# Patient Record
Sex: MALE
[De-identification: names, ages, dates, MRNs, and addresses within clinical notes are randomized per-mention and may not be internally consistent; named-entity substitution may affect disease eponyms.]

---

## 2019-09-16 ENCOUNTER — APPOINTMENT (OUTPATIENT)
Age: 59
End: 2019-09-16
Payer: MEDICAID

## 2019-09-16 VITALS
HEART RATE: 86 BPM | WEIGHT: 163 LBS | HEIGHT: 65 IN | DIASTOLIC BLOOD PRESSURE: 82 MMHG | BODY MASS INDEX: 27.16 KG/M2 | SYSTOLIC BLOOD PRESSURE: 131 MMHG

## 2019-09-16 DIAGNOSIS — Z84.2 FAMILY HISTORY OF OTHER DISEASES OF THE GENITOURINARY SYSTEM: ICD-10-CM

## 2019-09-16 PROCEDURE — 99214 OFFICE O/P EST MOD 30 MIN: CPT

## 2019-09-16 NOTE — REVIEW OF SYSTEMS
[see HPI] : see HPI [Muscle Weakness] : muscle weakness [Feelings Of Weakness] : feelings of weakness [Erectile Dysfunction] : erectile dysfunction [Negative] : Heme/Lymph

## 2019-09-16 NOTE — HISTORY OF PRESENT ILLNESS
[FreeTextEntry1] : 60 yo M previously seen in 2016 for ED and low testosterone\par Was doing well until a few months ago, noticed some right sided abdominal swelling\par PCP ordered CT to evaluate for a possible hernia\par Found to have bilateral nephrolithiasis\par no gross hematuria\par no dysuria, no LUTS\par No flank pain or abdominal pain\par Drinks 1 bottle of water per day, 1 cup of coffee, 1=2 cups of coke daily\par

## 2019-09-16 NOTE — PHYSICAL EXAM
[General Appearance - Well Developed] : well developed [General Appearance - Well Nourished] : well nourished [Normal Appearance] : normal appearance [Well Groomed] : well groomed [General Appearance - In No Acute Distress] : no acute distress [Abdomen Soft] : soft [Abdomen Tenderness] : non-tender [Abdomen Hernia] : no hernia was discovered [Costovertebral Angle Tenderness] : no ~M costovertebral angle tenderness [Urethral Meatus] : meatus normal [Penis Abnormality] : normal uncircumcised penis [Urinary Bladder Findings] : the bladder was normal on palpation [Scrotum] : the scrotum was normal [Rectal Exam - Seminal Vesicles] : the seminal vesicles were normal [Epididymis] : the epididymides were normal [Testes Tenderness] : no tenderness of the testes [Testes Mass (___cm)] : there were no testicular masses [Anus Abnormality] : the anus and perineum were normal [Rectal Exam - Rectum] : digital rectal exam was normal [Prostate Tenderness] : the prostate was not tender [No Prostate Nodules] : no prostate nodules [Prostate Size ___ gm] : prostate size [unfilled] gm [Skin Color & Pigmentation] : normal skin color and pigmentation [Skin Turgor] : supple [Heart Rate And Rhythm] : Heart rate and rhythm were normal [Edema] : no peripheral edema [] : no respiratory distress [Respiration, Rhythm And Depth] : normal respiratory rhythm and effort [Exaggerated Use Of Accessory Muscles For Inspiration] : no accessory muscle use [Auscultation Breath Sounds / Voice Sounds] : lungs were clear to auscultation bilaterally [Oriented To Time, Place, And Person] : oriented to person, place, and time [Affect] : the affect was normal [Mood] : the mood was normal [Not Anxious] : not anxious [Normal Station and Gait] : the gait and station were normal for the patient's age [No Focal Deficits] : no focal deficits [No Palpable Adenopathy] : no palpable adenopathy

## 2019-09-16 NOTE — ASSESSMENT
[FreeTextEntry1] : 60 yo M with bilateral nephrolithiasis\par \par - Reviewed CT scan which showed bilateral nephrolithiasis located in the distal ureter but no hydronephrosis \par - Reviewed labwork done by PCP which showed normal UA and normal PSA. No BMP results.\par - Check BMP today\par - Discussed the different treatment modalities for nephrolithiasis with the patient, including medical management, spontaneous stone passage, percutaneous stone extraction, extracorporeal shock wave lithotripsy, and ureteroscopy with laser lithotripsy and stone extraction. Usually, given the size and location of the stone, I would recommend medical management. HOWEVER, given that it is bilateral, strongly recommended to pt to proceed with ureteroscopy. The risks, benefits, and alternatives to ureteroscopy were discussed with the patient, including but not limited to pain, infection, bleeding, bladder injury, ureteral injury, renal injury, and treatment failure. I also discussed the possible need for a temporary ureteral stent. I discussed the possible side effects of a temporary ureteral stent, including flank pain, hematuria, and bladder spasms. \par - Pt wishes to continue observation until he returns from his trip at the end of Oct. Discussed the risks of doing this including development of renal insufficiency as well as risk of stones becoming symptomatic while he is travelling\par - For now, will plan to repeat CT stone hunt when he returns\par - Discussed possible etiologies for nephrolithiasis. Reviewed behavioral modifications including adequate hydration, cutting back on coffee, dark sodas, dark teas, low sodium diet, increasing citrate levels with lemon juice. \par - Discussed importance of seeking medical attention should intractable flank pain with nausea, vomiting or fever occur\par

## 2019-09-19 LAB
ANION GAP SERPL CALC-SCNC: 17 MMOL/L
BUN SERPL-MCNC: 15 MG/DL
CALCIUM SERPL-MCNC: 10.1 MG/DL
CHLORIDE SERPL-SCNC: 101 MMOL/L
CO2 SERPL-SCNC: 24 MMOL/L
CREAT SERPL-MCNC: 1.49 MG/DL
GLUCOSE SERPL-MCNC: 103 MG/DL
POTASSIUM SERPL-SCNC: 4.5 MMOL/L
SODIUM SERPL-SCNC: 141 MMOL/L

## 2020-01-27 ENCOUNTER — APPOINTMENT (OUTPATIENT)
Dept: UROLOGY | Facility: CLINIC | Age: 60
End: 2020-01-27
Payer: MEDICAID

## 2020-01-27 VITALS
DIASTOLIC BLOOD PRESSURE: 95 MMHG | WEIGHT: 164 LBS | HEART RATE: 112 BPM | BODY MASS INDEX: 27.32 KG/M2 | SYSTOLIC BLOOD PRESSURE: 149 MMHG | HEIGHT: 65 IN

## 2020-01-27 DIAGNOSIS — N20.0 CALCULUS OF KIDNEY: ICD-10-CM

## 2020-01-27 DIAGNOSIS — N13.8 BENIGN PROSTATIC HYPERPLASIA WITH LOWER URINARY TRACT SYMPMS: ICD-10-CM

## 2020-01-27 DIAGNOSIS — N40.1 BENIGN PROSTATIC HYPERPLASIA WITH LOWER URINARY TRACT SYMPMS: ICD-10-CM

## 2020-01-27 PROCEDURE — 99214 OFFICE O/P EST MOD 30 MIN: CPT

## 2020-01-27 RX ORDER — TAMSULOSIN HYDROCHLORIDE 0.4 MG/1
0.4 CAPSULE ORAL
Qty: 90 | Refills: 3 | Status: ACTIVE | COMMUNITY
Start: 2020-01-27 | End: 1900-01-01

## 2020-01-27 NOTE — HISTORY OF PRESENT ILLNESS
[FreeTextEntry1] : 60 yo M previously seen in 2016 for ED and low testosterone\par Was doing well until a few months ago, noticed some right sided abdominal swelling\par PCP ordered CT to evaluate for a possible hernia\par Found to have bilateral nephrolithiasis\par no gross hematuria\par no dysuria, no LUTS\par No flank pain or abdominal pain\par Drinks 1 bottle of water per day, 1 cup of coffee, 1=2 cups of coke daily\par \par 1/27/20 Interval history: Continues to have no symptoms\par Here to review repeat CT scan results\par

## 2020-01-27 NOTE — REVIEW OF SYSTEMS
[see HPI] : see HPI [Muscle Weakness] : muscle weakness [Erectile Dysfunction] : erectile dysfunction [Feelings Of Weakness] : feelings of weakness [Negative] : Heme/Lymph

## 2020-01-27 NOTE — ASSESSMENT
[FreeTextEntry1] : 58 yo M with bilateral ureteral nephrolithiasis\par \par - Repeat BUN/Cr\par - Reviewed CT which showed persistent bilateral ureteral nephrolithiasis. Also showed an enlarged prostate impinging on base of bladder\par - Again, spent extensive period of time reviewing the risks and benefits of continued observation vs surgical intervention. Given location and the bilateral nature of stones, strongly encouraged pt to undergo bilateral URS/HLL in order to prevent damage to renal function. Pt extremely worried about complication rate of procedure as well as it's potential effects on his ED. Reassured pt that while complication rate is not zero percent, it is fairly low.\par - Pt would like to discuss with his PCP as well as consider a second opinion

## 2020-02-03 LAB
BUN SERPL-MCNC: 18 MG/DL
CREAT SERPL-MCNC: 1.27 MG/DL